# Patient Record
Sex: FEMALE | Race: WHITE | Employment: OTHER | URBAN - METROPOLITAN AREA
[De-identification: names, ages, dates, MRNs, and addresses within clinical notes are randomized per-mention and may not be internally consistent; named-entity substitution may affect disease eponyms.]

---

## 2017-07-24 PROCEDURE — 87077 CULTURE AEROBIC IDENTIFY: CPT | Performed by: INTERNAL MEDICINE

## 2017-07-24 PROCEDURE — 87086 URINE CULTURE/COLONY COUNT: CPT | Performed by: INTERNAL MEDICINE

## 2017-07-24 PROCEDURE — 87186 SC STD MICRODIL/AGAR DIL: CPT | Performed by: INTERNAL MEDICINE

## 2018-03-16 PROCEDURE — 87086 URINE CULTURE/COLONY COUNT: CPT | Performed by: INTERNAL MEDICINE

## 2018-05-12 ENCOUNTER — APPOINTMENT (OUTPATIENT)
Dept: CT IMAGING | Age: 83
End: 2018-05-12
Attending: NURSE PRACTITIONER
Payer: MEDICARE

## 2018-05-12 ENCOUNTER — HOSPITAL ENCOUNTER (OUTPATIENT)
Age: 83
Discharge: HOME | End: 2018-05-12
Payer: MEDICARE

## 2018-05-12 ENCOUNTER — APPOINTMENT (OUTPATIENT)
Dept: ULTRASOUND IMAGING | Age: 83
End: 2018-05-12
Attending: FAMILY MEDICINE
Payer: MEDICARE

## 2018-05-12 VITALS
OXYGEN SATURATION: 97 % | SYSTOLIC BLOOD PRESSURE: 175 MMHG | RESPIRATION RATE: 20 BRPM | HEART RATE: 81 BPM | TEMPERATURE: 98 F | DIASTOLIC BLOOD PRESSURE: 72 MMHG

## 2018-05-12 DIAGNOSIS — K40.90 NON-RECURRENT UNILATERAL INGUINAL HERNIA WITHOUT OBSTRUCTION OR GANGRENE: Primary | ICD-10-CM

## 2018-05-12 PROCEDURE — 74177 CT ABD & PELVIS W/CONTRAST: CPT | Performed by: NURSE PRACTITIONER

## 2018-05-12 PROCEDURE — 99215 OFFICE O/P EST HI 40 MIN: CPT

## 2018-05-12 PROCEDURE — 80047 BASIC METABLC PNL IONIZED CA: CPT

## 2018-05-12 PROCEDURE — 96360 HYDRATION IV INFUSION INIT: CPT

## 2018-05-12 PROCEDURE — 76881 US COMPL JOINT R-T W/IMG: CPT | Performed by: FAMILY MEDICINE

## 2018-05-12 PROCEDURE — 85025 COMPLETE CBC W/AUTO DIFF WBC: CPT | Performed by: NURSE PRACTITIONER

## 2018-05-12 PROCEDURE — 99214 OFFICE O/P EST MOD 30 MIN: CPT

## 2018-05-12 PROCEDURE — 96361 HYDRATE IV INFUSION ADD-ON: CPT

## 2018-05-12 PROCEDURE — 81002 URINALYSIS NONAUTO W/O SCOPE: CPT | Performed by: NURSE PRACTITIONER

## 2018-05-12 RX ORDER — ACETAMINOPHEN AND CODEINE PHOSPHATE 300; 30 MG/1; MG/1
1 TABLET ORAL EVERY 8 HOURS PRN
Qty: 10 TABLET | Refills: 0 | Status: SHIPPED | OUTPATIENT
Start: 2018-05-12 | End: 2018-05-19

## 2018-05-12 RX ORDER — SODIUM CHLORIDE 9 MG/ML
500 INJECTION, SOLUTION INTRAVENOUS ONCE
Status: COMPLETED | OUTPATIENT
Start: 2018-05-12 | End: 2018-05-12

## 2018-05-12 NOTE — ED NOTES
Warm blanket and pillow provided. Pt made comfortable. Bed on low fowlers. Lights dimmed. 1 side rail up. Call light within reach. IVF infusing per alaris pump.  Waiting to be taken to CT

## 2018-05-12 NOTE — ED PROVIDER NOTES
Patient Seen in: [facility] In [facility]    History   Patient presents with:  Pain    Stated Complaint: GROIN PAIN X 3DAYS    15-year-old female presents today with complaints of pain to the right lower quadrant as well as feeling mass to t RT arm    Family history reviewed and is not pertinent to presenting problem.     Smoking status: Former Smoker                                                              Packs/day: 1.00      Years: 40.00        Quit date: 6/22/1991  Smokeless tobacco: Ne for the following:     ISTAT Potassium 5.2 (*)     ISTAT Ionized Calcium 1.10 (*)     ISTAT Creatinine 1.10 (*)     All other components within normal limits       ED Course as of May 12 1316  ------------------------------------------------------------ / incarceration. Noted hernia on US and the need for repair was discussed. She was educated regarding signs and symptoms of concern with an obstructed/incarcerated hernia and the need to go to the ER if so.    Patient verbalized understanding and agreed wit

## 2018-05-12 NOTE — ED INITIAL ASSESSMENT (HPI)
Right groin- pain x 1 week , today felt a bulge on the groin right.  Denies any urinary  Problems, fever, blood in urine

## 2018-05-16 ENCOUNTER — OFFICE VISIT (OUTPATIENT)
Dept: SURGERY | Facility: CLINIC | Age: 83
End: 2018-05-16

## 2018-05-16 VITALS — TEMPERATURE: 99 F | HEART RATE: 61 BPM | HEIGHT: 61.5 IN | WEIGHT: 129 LBS | BODY MASS INDEX: 24.04 KG/M2

## 2018-05-16 DIAGNOSIS — K40.90 RIGHT INGUINAL HERNIA: Primary | ICD-10-CM

## 2018-05-16 PROCEDURE — 99204 OFFICE O/P NEW MOD 45 MIN: CPT | Performed by: SURGERY

## 2018-05-16 NOTE — H&P
New Patient Visit Note       Active Problems      1. Right inguinal hernia        Chief Complaint   Patient presents with:  Hernia: New patient referred by [facility] for hernia consult. c/o right groin bulge and discomfort.        History of Present Il today. Past Medical History:   Diagnosis Date   • Asthma     RARE - ALLERGIC TO CATS. 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  Surgery for removal   • Chronic rhinitis    • Esophageal reflux    • Eustachian tube dysf Acetaminophen-Codeine #3 300-30 MG Oral Tab Take 1 tablet by mouth every 8 (eight) hours as needed for Pain.  Disp: 10 tablet Rfl: 0   OMEPRAZOLE 20 MG Oral Capsule Delayed Release TAKE ONE CAPSULE BY MOUTH 1/2 HOUR PRIOR TO BREAKFAST DAILY Disp: 30 capsu Findings   Pulse 61   Temp 98.7 °F (37.1 °C) (Oral)   Ht 61.5\"   Wt 129 lb   BMI 23.98 kg/m²   Physical Exam   Constitutional: She is oriented to person, place, and time. She appears well-developed and well-nourished. No distress.    HENT:   Head: Normocep containing fat and fluid is noted. This measures approximately 4.7 x 3.4 x 5.1 cm. There is no movement with Valsalva.            Dictated by: Flaco Calvo MD on 5/12/2018 at 12:55       Approved by: Flaco Calvo MD           [facility]

## 2018-05-21 ENCOUNTER — APPOINTMENT (OUTPATIENT)
Dept: LAB | Facility: HOSPITAL | Age: 83
End: 2018-05-21
Payer: MEDICARE

## 2018-05-21 DIAGNOSIS — K40.90 RIGHT INGUINAL HERNIA: ICD-10-CM

## 2018-05-21 PROCEDURE — 80048 BASIC METABOLIC PNL TOTAL CA: CPT

## 2018-05-21 PROCEDURE — 36415 COLL VENOUS BLD VENIPUNCTURE: CPT

## 2018-05-21 PROCEDURE — 93010 ELECTROCARDIOGRAM REPORT: CPT | Performed by: INTERNAL MEDICINE

## 2018-05-21 PROCEDURE — 93005 ELECTROCARDIOGRAM TRACING: CPT

## 2018-05-23 ENCOUNTER — TELEPHONE (OUTPATIENT)
Dept: SURGERY | Facility: CLINIC | Age: 83
End: 2018-05-23

## 2018-05-23 NOTE — TELEPHONE ENCOUNTER
Pt scheduled for surgery tomorrow and wanted to review the gatorade protocol - reviewed and pt verbalized understanding.

## 2018-05-24 ENCOUNTER — HOSPITAL ENCOUNTER (OUTPATIENT)
Facility: HOSPITAL | Age: 83
Setting detail: HOSPITAL OUTPATIENT SURGERY
Discharge: HOME | End: 2018-05-24
Attending: SURGERY | Admitting: SURGERY
Payer: MEDICARE

## 2018-05-24 ENCOUNTER — SURGERY (OUTPATIENT)
Age: 83
End: 2018-05-24

## 2018-05-24 ENCOUNTER — ANESTHESIA EVENT (OUTPATIENT)
Dept: SURGERY | Facility: HOSPITAL | Age: 83
End: 2018-05-24

## 2018-05-24 ENCOUNTER — ANESTHESIA (OUTPATIENT)
Dept: SURGERY | Facility: HOSPITAL | Age: 83
End: 2018-05-24

## 2018-05-24 VITALS
HEART RATE: 71 BPM | OXYGEN SATURATION: 93 % | WEIGHT: 127.94 LBS | SYSTOLIC BLOOD PRESSURE: 125 MMHG | DIASTOLIC BLOOD PRESSURE: 65 MMHG | BODY MASS INDEX: 23.85 KG/M2 | RESPIRATION RATE: 16 BRPM | TEMPERATURE: 98 F | HEIGHT: 61.5 IN

## 2018-05-24 DIAGNOSIS — K40.90 RIGHT INGUINAL HERNIA: Primary | ICD-10-CM

## 2018-05-24 PROCEDURE — 0YU74JZ SUPPLEMENT RIGHT FEMORAL REGION WITH SYNTHETIC SUBSTITUTE, PERCUTANEOUS ENDOSCOPIC APPROACH: ICD-10-PCS | Performed by: SURGERY

## 2018-05-24 DEVICE — BARD MESH
Type: IMPLANTABLE DEVICE | Site: INGUINAL | Status: FUNCTIONAL
Brand: BARD MESH

## 2018-05-24 RX ORDER — ONDANSETRON 2 MG/ML
4 INJECTION INTRAMUSCULAR; INTRAVENOUS AS NEEDED
Status: DISCONTINUED | OUTPATIENT
Start: 2018-05-24 | End: 2018-05-24

## 2018-05-24 RX ORDER — DIPHENHYDRAMINE HYDROCHLORIDE 50 MG/ML
12.5 INJECTION INTRAMUSCULAR; INTRAVENOUS AS NEEDED
Status: DISCONTINUED | OUTPATIENT
Start: 2018-05-24 | End: 2018-05-24

## 2018-05-24 RX ORDER — HEPARIN SODIUM 5000 [USP'U]/ML
5000 INJECTION, SOLUTION INTRAVENOUS; SUBCUTANEOUS ONCE
Status: COMPLETED | OUTPATIENT
Start: 2018-05-24 | End: 2018-05-24

## 2018-05-24 RX ORDER — HYDROCODONE BITARTRATE AND ACETAMINOPHEN 5; 325 MG/1; MG/1
TABLET ORAL
Qty: 20 TABLET | Refills: 0 | Status: SHIPPED | OUTPATIENT
Start: 2018-05-24 | End: 2018-07-26 | Stop reason: ALTCHOICE

## 2018-05-24 RX ORDER — HYDROMORPHONE HYDROCHLORIDE 1 MG/ML
0.4 INJECTION, SOLUTION INTRAMUSCULAR; INTRAVENOUS; SUBCUTANEOUS EVERY 5 MIN PRN
Status: DISCONTINUED | OUTPATIENT
Start: 2018-05-24 | End: 2018-05-24

## 2018-05-24 RX ORDER — NALOXONE HYDROCHLORIDE 0.4 MG/ML
80 INJECTION, SOLUTION INTRAMUSCULAR; INTRAVENOUS; SUBCUTANEOUS AS NEEDED
Status: DISCONTINUED | OUTPATIENT
Start: 2018-05-24 | End: 2018-05-24

## 2018-05-24 RX ORDER — SODIUM CHLORIDE, SODIUM LACTATE, POTASSIUM CHLORIDE, CALCIUM CHLORIDE 600; 310; 30; 20 MG/100ML; MG/100ML; MG/100ML; MG/100ML
INJECTION, SOLUTION INTRAVENOUS CONTINUOUS
Status: DISCONTINUED | OUTPATIENT
Start: 2018-05-24 | End: 2018-05-24

## 2018-05-24 RX ORDER — BUPIVACAINE HYDROCHLORIDE AND EPINEPHRINE 5; 5 MG/ML; UG/ML
INJECTION, SOLUTION EPIDURAL; INTRACAUDAL; PERINEURAL AS NEEDED
Status: DISCONTINUED | OUTPATIENT
Start: 2018-05-24 | End: 2018-05-24 | Stop reason: HOSPADM

## 2018-05-24 RX ORDER — ALBUTEROL SULFATE 2.5 MG/3ML
2.5 SOLUTION RESPIRATORY (INHALATION) ONCE
Status: COMPLETED | OUTPATIENT
Start: 2018-05-24 | End: 2018-05-24

## 2018-05-24 RX ORDER — DEXAMETHASONE SODIUM PHOSPHATE 4 MG/ML
4 VIAL (ML) INJECTION AS NEEDED
Status: DISCONTINUED | OUTPATIENT
Start: 2018-05-24 | End: 2018-05-24

## 2018-05-24 RX ORDER — CLINDAMYCIN PHOSPHATE 900 MG/50ML
900 INJECTION INTRAVENOUS ONCE
Status: DISCONTINUED | OUTPATIENT
Start: 2018-05-24 | End: 2018-05-24 | Stop reason: HOSPADM

## 2018-05-24 RX ORDER — ACETAMINOPHEN 500 MG
1000 TABLET ORAL EVERY 6 HOURS PRN
COMMUNITY
End: 2018-07-26 | Stop reason: ALTCHOICE

## 2018-05-24 RX ORDER — ALBUTEROL SULFATE 2.5 MG/3ML
SOLUTION RESPIRATORY (INHALATION)
Status: COMPLETED
Start: 2018-05-24 | End: 2018-05-24

## 2018-05-24 NOTE — OPERATIVE REPORT
PREOPERATIVE DIAGNOSIS: Incarcerated right inguinal hernia. POSTOPERATIVE DIAGNOSIS: Incarcerated right femoral hernia.    PROCEDURE PERFORMED: Laparoscopic right femoral hernia repair with mesh  SURGEON:  Cassie Betancourt M.D.  ASSISTANT: Miguelina Reinoso tightly incarcerated in the femoral canal.  The mesh was soaked in bacitracin and passed on to the field. This was then trimmed to size and passed down the umbilical port. This was secured in place using the Titanium tacker.   The peritoneum was then reap

## 2018-05-24 NOTE — ANESTHESIA POSTPROCEDURE EVALUATION
[facility] Patient Status:  Hospital Outpatient Surgery   Age/Gender 81year old female MRN [id number]   [facility] SURGERY Attending Francine Broderick MD   [facility] Day # 0 PCP David Tyson MD       Anesthesia Post-o

## 2018-05-24 NOTE — H&P (VIEW-ONLY)
New Patient Visit Note       Active Problems      1. Right inguinal hernia        Chief Complaint   Patient presents with:  Hernia: New patient referred by [facility] for hernia consult. c/o right groin bulge and discomfort.        History of Present Il Past Medical History:   Diagnosis Date   • Asthma     RARE - ALLERGIC TO CATS. THEN USES AN INHALER   • [facility]     stage 4 melanoma back of right arm.  Surgery for removal   • Chronic rhinitis    • Esophageal reflux    • Eustachian tube dysfunction 2/ Acetaminophen-Codeine #3 300-30 MG Oral Tab Take 1 tablet by mouth every 8 (eight) hours as needed for Pain.  Disp: 10 tablet Rfl: 0   OMEPRAZOLE 20 MG Oral Capsule Delayed Release TAKE ONE CAPSULE BY MOUTH 1/2 HOUR PRIOR TO BREAKFAST DAILY Disp: 30 capsule Physical Findings   Pulse 61   Temp 98.7 °F (37.1 °C) (Oral)   Ht 61.5\"   Wt 129 lb   BMI 23.98 kg/m²   Physical Exam   Constitutional: She is oriented to person, place, and time. She appears well-developed and well-nourished. No distress.    HENT:   Head: CONCLUSION:  Right inguinal hernia containing fat and fluid is noted. This measures approximately 4.7 x 3.4 x 5.1 cm. There is no movement with Valsalva.            Dictated by: Talia Ramirez MD on 5/12/2018 at 12:55       Approved by: Miguel Pina

## 2018-05-24 NOTE — INTERVAL H&P NOTE
Pre-op Diagnosis: Right inguinal hernia [K40.90]    The above referenced H&P was reviewed by Meme Nolan MD on 5/24/2018, the patient was examined and no significant changes have occurred in the patient's condition since the H&P was performed.   I d

## 2018-05-24 NOTE — ANESTHESIA PREPROCEDURE EVALUATION
PRE-OP EVALUATION    Patient Name: Radha Denson    Pre-op Diagnosis: Right inguinal hernia [K40.90]    Procedure(s):  RIGHT LAPAROSCOPIC INGUINAL HERNIA REPAIR WITH MESH    Surgeon(s) and Role:     * Gregorio Carbone MD - Primary    Pre-op vitals rev Glasses of wine per week         Comment: per week       Drug use: No     Available pre-op labs reviewed.     Lab Results  Component Value Date   MCV 94.5 05/12/2018   MCHC 31.8 05/12/2018       Lab Results  Component Value Date    05/21/2018   K 4.5

## 2018-05-29 ENCOUNTER — OFFICE VISIT (OUTPATIENT)
Dept: SURGERY | Facility: CLINIC | Age: 83
End: 2018-05-29

## 2018-05-29 VITALS
SYSTOLIC BLOOD PRESSURE: 147 MMHG | DIASTOLIC BLOOD PRESSURE: 72 MMHG | WEIGHT: 127 LBS | HEART RATE: 70 BPM | HEIGHT: 61.5 IN | TEMPERATURE: 98 F | BODY MASS INDEX: 23.67 KG/M2

## 2018-05-29 DIAGNOSIS — K40.90 RIGHT INGUINAL HERNIA: Primary | ICD-10-CM

## 2018-05-29 PROCEDURE — 99024 POSTOP FOLLOW-UP VISIT: CPT | Performed by: PHYSICIAN ASSISTANT

## 2018-05-29 NOTE — PROGRESS NOTES
Post Operative Visit Note       Active Problems  1. Right inguinal hernia         Chief Complaint   Patient presents with:  Post-Op: p/o RIGHT LAPAROSCOPIC INGUINAL HERNIA REPAIR WITH MESH on 5/24. PT DENIES ANY ISSUES OR COMPLAINTS.        History of Prese SURG      Comment: Performed by Dottie Kehr at [address]  No date: SKIN SURGERY      Comment: melanoma removed RT arm    The family history and social history have been reviewed by me today.     Family History   Problem Rela needed. Disp: 30 tablet Rfl: 0   Triamcinolone Acetonide (NASACORT ALLERGY 24HR NA) 1 spray by Nasal route daily. Disp:  Rfl:          Review of Systems  The Review of Systems has been reviewed by me during today.   Review of Systems   Constitutional: Neg Normal range of motion. She exhibits no edema. Legs:  Neurological: She is alert and oriented to person, place, and time. Skin: Skin is warm and dry. No rash noted. She is not diaphoretic. No erythema.    Psychiatric: She has a normal mood and affe

## 2018-05-31 ENCOUNTER — OFFICE VISIT (OUTPATIENT)
Dept: SURGERY | Facility: CLINIC | Age: 83
End: 2018-05-31

## 2018-05-31 VITALS
TEMPERATURE: 98 F | DIASTOLIC BLOOD PRESSURE: 80 MMHG | HEIGHT: 61.5 IN | WEIGHT: 127 LBS | HEART RATE: 69 BPM | SYSTOLIC BLOOD PRESSURE: 159 MMHG | BODY MASS INDEX: 23.67 KG/M2

## 2018-05-31 DIAGNOSIS — K40.90 RIGHT INGUINAL HERNIA: Primary | ICD-10-CM

## 2018-05-31 PROCEDURE — 99024 POSTOP FOLLOW-UP VISIT: CPT | Performed by: PHYSICIAN ASSISTANT

## 2018-05-31 NOTE — PROGRESS NOTES
Post Operative Visit Note       Active Problems  1. Right inguinal hernia         Chief Complaint   Patient presents with:  Post-Op: p/o 2 day follow-up--lap right femoral hernia repair 5/24.  pt c/o of bumps, irritation under incision site x 1 day, denies Surgeon: Denette Castleman, MD;  Location: [facility] MAIN               OR  No date: OTHER SURGICAL HISTORY      Comment: ovary cyst  11/30/09: [facility] N/CARPAL TUNNEL SURG      Comment: Performed by Olga Malcolm at [place] [address], Rfl:    Cod Liver Oil 10 MINIM Oral Cap Take by mouth. Disp:  Rfl:    Meclizine HCl 12.5 MG Oral Tab Take 1 tablet (12.5 mg total) by mouth 3 (three) times daily as needed.  Disp: 30 tablet Rfl: 0   Triamcinolone Acetonide (NASACORT ALLERGY 24HR NA) 1 spra abdomen is soft, nontender, nondistended, with normoactive bowel sounds present. The patient's laparoscopic incisions are all clean, dry, intact, without erythema or drainage.     The patient has a prior midline incision which extends from the umbilicus to

## 2018-06-15 ENCOUNTER — HOSPITAL ENCOUNTER (OUTPATIENT)
Age: 83
Discharge: HOME | End: 2018-06-15
Attending: FAMILY MEDICINE
Payer: MEDICARE

## 2018-06-15 VITALS
DIASTOLIC BLOOD PRESSURE: 68 MMHG | WEIGHT: 127 LBS | HEART RATE: 80 BPM | RESPIRATION RATE: 20 BRPM | HEIGHT: 61.5 IN | BODY MASS INDEX: 23.67 KG/M2 | SYSTOLIC BLOOD PRESSURE: 152 MMHG | TEMPERATURE: 98 F | OXYGEN SATURATION: 95 %

## 2018-06-15 DIAGNOSIS — R09.82 POST-NASAL DRIP: ICD-10-CM

## 2018-06-15 DIAGNOSIS — R09.81 SINUS CONGESTION: ICD-10-CM

## 2018-06-15 DIAGNOSIS — J40 BRONCHITIS: Primary | ICD-10-CM

## 2018-06-15 PROCEDURE — 99213 OFFICE O/P EST LOW 20 MIN: CPT

## 2018-06-15 PROCEDURE — 99214 OFFICE O/P EST MOD 30 MIN: CPT

## 2018-06-15 RX ORDER — BENZONATATE 100 MG/1
100 CAPSULE ORAL 3 TIMES DAILY PRN
Qty: 15 CAPSULE | Refills: 0 | Status: SHIPPED | OUTPATIENT
Start: 2018-06-15 | End: 2018-06-20

## 2018-06-15 RX ORDER — AZITHROMYCIN 250 MG/1
TABLET, FILM COATED ORAL
Qty: 1 PACKAGE | Refills: 0 | Status: SHIPPED | OUTPATIENT
Start: 2018-06-15 | End: 2018-06-20

## 2018-06-15 NOTE — ED PROVIDER NOTES
Patient Seen in: [facility]    History   Patient presents with:  Cough/URI    Stated Complaint: sore throat/cough x 3 days    HPI  80 yo F here with complaints of a sore throat / more of a scratchy throat and cough X 3 days - no Packs/day: 1.00      Years: 40.00        Quit date: 6/22/1991  Smokeless tobacco: Never Used                      Alcohol use: Yes           4.2 oz/week     Glasses of wine: 7 per week     Comment: per week      Review of Sys days as needed for cough   Continue with Albuterol inhaler 2 puffs every 4-6 hours over the next 48 hours - use SPACER for effective administration   Rx AZITHROMYCIN AS PRESCRIBED - START THIS IN 1 WEEK IF SYMPTOMS HAVE NOT IMPROVED.    Take daily OTC Probi

## 2019-05-29 PROCEDURE — 87088 URINE BACTERIA CULTURE: CPT | Performed by: INTERNAL MEDICINE

## 2019-05-29 PROCEDURE — 87186 SC STD MICRODIL/AGAR DIL: CPT | Performed by: INTERNAL MEDICINE

## 2019-05-29 PROCEDURE — 87086 URINE CULTURE/COLONY COUNT: CPT | Performed by: INTERNAL MEDICINE

## 2019-07-21 ENCOUNTER — WALK IN (OUTPATIENT)
Dept: URGENT CARE | Age: 84
End: 2019-07-21

## 2019-07-21 VITALS
HEIGHT: 61 IN | OXYGEN SATURATION: 96 % | BODY MASS INDEX: 23.41 KG/M2 | WEIGHT: 124 LBS | HEART RATE: 84 BPM | RESPIRATION RATE: 15 BRPM | DIASTOLIC BLOOD PRESSURE: 60 MMHG | SYSTOLIC BLOOD PRESSURE: 124 MMHG | TEMPERATURE: 98.2 F

## 2019-07-21 DIAGNOSIS — J01.10 ACUTE FRONTAL SINUSITIS, RECURRENCE NOT SPECIFIED: Primary | ICD-10-CM

## 2019-07-21 DIAGNOSIS — J20.9 ACUTE BRONCHITIS, UNSPECIFIED ORGANISM: ICD-10-CM

## 2019-07-21 PROCEDURE — 99202 OFFICE O/P NEW SF 15 MIN: CPT | Performed by: NURSE PRACTITIONER

## 2019-07-21 RX ORDER — ALBUTEROL SULFATE 90 UG/1
2 AEROSOL, METERED RESPIRATORY (INHALATION) EVERY 4 HOURS PRN
Qty: 1 INHALER | Refills: 0 | Status: SHIPPED | OUTPATIENT
Start: 2019-07-21

## 2019-07-21 RX ORDER — LISINOPRIL 10 MG/1
10 TABLET ORAL DAILY
COMMUNITY

## 2019-07-21 RX ORDER — AZITHROMYCIN 250 MG/1
TABLET, FILM COATED ORAL
Qty: 6 TABLET | Refills: 0 | Status: SHIPPED | OUTPATIENT
Start: 2019-07-21

## 2019-07-21 RX ORDER — ALBUTEROL SULFATE 90 UG/1
2 AEROSOL, METERED RESPIRATORY (INHALATION) EVERY 4 HOURS PRN
COMMUNITY
End: 2019-07-21 | Stop reason: SDUPTHER

## 2019-07-21 ASSESSMENT — ENCOUNTER SYMPTOMS
SINUS PAIN: 1
FATIGUE: 0
SINUS PRESSURE: 1
HEADACHES: 0
SORE THROAT: 1
RHINORRHEA: 1
TROUBLE SWALLOWING: 0
SHORTNESS OF BREATH: 1
GASTROINTESTINAL NEGATIVE: 1
VOICE CHANGE: 1
WHEEZING: 0
COUGH: 1
EYE REDNESS: 0
DIZZINESS: 0
EYE DISCHARGE: 0
CHILLS: 1

## 2019-08-21 ENCOUNTER — OFFICE VISIT (OUTPATIENT)
Dept: SURGERY | Facility: CLINIC | Age: 84
End: 2019-08-21
Payer: MEDICARE

## 2019-08-21 VITALS
OXYGEN SATURATION: 96 % | SYSTOLIC BLOOD PRESSURE: 145 MMHG | HEIGHT: 61 IN | RESPIRATION RATE: 18 BRPM | DIASTOLIC BLOOD PRESSURE: 70 MMHG | WEIGHT: 120.81 LBS | BODY MASS INDEX: 22.81 KG/M2 | HEART RATE: 83 BPM

## 2019-08-21 DIAGNOSIS — C43.61 MALIGNANT MELANOMA OF RIGHT UPPER EXTREMITY INCLUDING SHOULDER (HCC): Primary | ICD-10-CM

## 2019-08-21 PROCEDURE — 99213 OFFICE O/P EST LOW 20 MIN: CPT | Performed by: SURGERY

## 2019-08-21 NOTE — PROGRESS NOTES
[facility]    Patient Name:  Mikki Galvan   YOB: 1935   Gender:  Female   Appt Date:  8/21/2019   Provider:  Claudia Alarcon MD   Insurance:  [address] [place] [address]  Primary Care Provider:REYNALDO Johnson tablet (12.5 mg total) by mouth 3 (three) times daily as needed. , Disp: 30 tablet, Rfl: 0     Allergies Reviewed:    Cefaclor                RASH  Ceclor                  ITCHING    Comment:Pt describes a shakiness & FELT VERY VERY HOT     History:  Review Review of Systems:  Patient reports no rapid or irregular heartbeat. She reports no chest pain. She reports no nausea. She reports no vomiting. She reports no diarrhea. She reports no constipation. She reports no bright red blood per rectum.  She report

## 2020-02-26 ENCOUNTER — OFFICE VISIT (OUTPATIENT)
Dept: SURGERY | Facility: CLINIC | Age: 85
End: 2020-02-26
Payer: MEDICARE

## 2020-02-26 VITALS
WEIGHT: 120 LBS | DIASTOLIC BLOOD PRESSURE: 70 MMHG | SYSTOLIC BLOOD PRESSURE: 149 MMHG | HEART RATE: 79 BPM | BODY MASS INDEX: 23 KG/M2

## 2020-02-26 DIAGNOSIS — L81.9 PIGMENTED SKIN LESION: Primary | ICD-10-CM

## 2020-02-26 PROCEDURE — 99213 OFFICE O/P EST LOW 20 MIN: CPT | Performed by: SURGERY

## 2020-02-26 PROCEDURE — 11104 PUNCH BX SKIN SINGLE LESION: CPT | Performed by: SURGERY

## 2020-02-26 PROCEDURE — 88305 TISSUE EXAM BY PATHOLOGIST: CPT | Performed by: SURGERY

## 2020-02-26 NOTE — PATIENT INSTRUCTIONS
1.Change guaze as needed if it becomes saturated. You may remove gauze and shower tomorrow. 2. Do not soak in water or go swimming until sutures are removed.   3. After showering, you may leave incision uncovered and open to air or cover with gauze and tap

## 2020-02-26 NOTE — PROGRESS NOTES
[facility]            Patient Name:  Haim Kilpatrick   YOB: 1935   Gender:  Female   Appt Date:  2/26/2020   Provider:  Silvio Robles MD   Insurance:  [address] [place] [address] Allergies Reviewed:    Cefaclor                RASH  Ceclor                  ITCHING    Comment:Pt describes a shakiness & FELT VERY VERY HOT     History:  Reviewed:  Past Medical History:   Diagnosis Date   • Asthma     RARE - ALLERGIC TO CATS.  THEN USES Patient reports no rapid or irregular heartbeat. She reports no chest pain. She reports no nausea. She reports no vomiting. She reports no diarrhea. She reports no constipation. She reports no bright red blood per rectum. She reports no fatigue.  She report the right mid back was prepped and draped in normal sterile fashion. 1% lidocaine was used as local anesthetic.  3 mm punch biopsy knife was used to excise the pigmented lesion which was sent to pathology for permanent section evaluation.   The site was r

## 2020-03-04 ENCOUNTER — NURSE ONLY (OUTPATIENT)
Dept: SURGERY | Facility: CLINIC | Age: 85
End: 2020-03-04
Payer: MEDICARE

## 2020-03-04 NOTE — PROGRESS NOTES
Patient presents for suture removal of punch biopsy site. Area cleaned with alcohol wipe and suture removed without difficulty. Incision healing without redness or drainage. Some bruising noted.  Patient provided with copy of path and our office will reach

## (undated) DEVICE — SOL  .9 1000ML BTL

## (undated) DEVICE — INSUFFLATION NEEDLE TO ESTABLISH PNEUMOPERITONEUM.: Brand: INSUFFLATION NEEDLE

## (undated) DEVICE — SUTURE VICRYL 5-0 PC-1

## (undated) DEVICE — KENDALL SCD EXPRESS SLEEVES, KNEE LENGTH, MEDIUM: Brand: KENDALL SCD

## (undated) DEVICE — GLOVE BIOGEL M SURG SZ 71/2

## (undated) DEVICE — TROCAR: Brand: KII SHIELDED BLADED ACCESS SYSTEM

## (undated) DEVICE — Device

## (undated) DEVICE — MONOFILAMENT ABSORBABLE SUTURE: Brand: MAXON

## (undated) DEVICE — SPONGE STICK WITH PVP-I: Brand: KENDALL

## (undated) DEVICE — CAPSURE PERMANENT FIXATION SYSTEM 30 PERMANENT FASTENERS: Brand: CAPSURE PERMANENT FIXATION SYSTEM

## (undated) DEVICE — PAD SACRAL SPAN AID

## (undated) DEVICE — TROCAR: Brand: KII® SLEEVE

## (undated) DEVICE — VIOLET BRAIDED (POLYGLACTIN 910), SYNTHETIC ABSORBABLE SUTURE: Brand: COATED VICRYL

## (undated) DEVICE — TROCAR: Brand: KII FIOS FIRST ENTRY

## (undated) DEVICE — GENERAL LAPAROS CDS-LF: Brand: MEDLINE INDUSTRIES, INC.

## (undated) NOTE — LETTER
18    Patient: Chrissie Lott  : 1935 Visit date: 2018    Dear  Dr. Peter Heart MD,    Thank you for referring Chrissie Lott to my practice. Please find my assessment and plan below.          Assessment   Right inguinal hernia  (primary en

## (undated) NOTE — Clinical Note
I had the pleasure of seeing Shelby Fish on 5/16/2018. Please see my attached note.   Teresa Starks MD FACS [facility]--Surgery